# Patient Record
Sex: MALE | Race: WHITE | NOT HISPANIC OR LATINO | Employment: FULL TIME | URBAN - METROPOLITAN AREA
[De-identification: names, ages, dates, MRNs, and addresses within clinical notes are randomized per-mention and may not be internally consistent; named-entity substitution may affect disease eponyms.]

---

## 2019-03-30 ENCOUNTER — HOSPITAL ENCOUNTER (EMERGENCY)
Facility: MEDICAL CENTER | Age: 24
End: 2019-03-30
Attending: EMERGENCY MEDICINE

## 2019-03-30 VITALS
WEIGHT: 222.66 LBS | RESPIRATION RATE: 20 BRPM | HEIGHT: 71 IN | OXYGEN SATURATION: 94 % | DIASTOLIC BLOOD PRESSURE: 70 MMHG | HEART RATE: 91 BPM | SYSTOLIC BLOOD PRESSURE: 129 MMHG | BODY MASS INDEX: 31.17 KG/M2 | TEMPERATURE: 98.2 F

## 2019-03-30 DIAGNOSIS — T15.91XA FOREIGN BODY OF RIGHT EYE, INITIAL ENCOUNTER: ICD-10-CM

## 2019-03-30 PROCEDURE — 65205 REMOVE FOREIGN BODY FROM EYE: CPT

## 2019-03-30 PROCEDURE — 700101 HCHG RX REV CODE 250

## 2019-03-30 PROCEDURE — 99284 EMERGENCY DEPT VISIT MOD MDM: CPT

## 2019-03-30 RX ORDER — POLYMYXIN B SULFATE AND TRIMETHOPRIM 1; 10000 MG/ML; [USP'U]/ML
2 SOLUTION OPHTHALMIC EVERY 4 HOURS
Qty: 1 BOTTLE | Refills: 0 | Status: SHIPPED | OUTPATIENT
Start: 2019-03-30 | End: 2019-04-04

## 2019-03-30 RX ORDER — TETRACAINE HYDROCHLORIDE 5 MG/ML
SOLUTION OPHTHALMIC
Status: COMPLETED
Start: 2019-03-30 | End: 2019-03-30

## 2019-03-30 RX ORDER — PROPARACAINE HYDROCHLORIDE 5 MG/ML
SOLUTION/ DROPS OPHTHALMIC
Status: COMPLETED
Start: 2019-03-30 | End: 2019-03-30

## 2019-03-30 RX ORDER — POLYMYXIN B SULFATE AND TRIMETHOPRIM 1; 10000 MG/ML; [USP'U]/ML
2 SOLUTION OPHTHALMIC EVERY 4 HOURS
Qty: 1 BOTTLE | Refills: 0 | Status: SHIPPED | OUTPATIENT
Start: 2019-03-30 | End: 2019-03-30

## 2019-03-30 RX ADMIN — PROPARACAINE HYDROCHLORIDE 3 DROP: 5 SOLUTION/ DROPS OPHTHALMIC at 14:09

## 2019-03-30 RX ADMIN — FLUORESCEIN SODIUM 1 MG: 1 STRIP OPHTHALMIC at 14:08

## 2019-03-30 ASSESSMENT — PAIN DESCRIPTION - DESCRIPTORS: DESCRIPTORS: ACHING

## 2019-03-30 NOTE — ED NOTES
Pt in RM 11 eye chart done  C/o being light sensitive to R eye  Lights turned off for comfort   MD to evaluate

## 2019-03-30 NOTE — ED PROVIDER NOTES
"ED Provider Note    CHIEF COMPLAINT  Chief Complaint   Patient presents with   • Eye Pain   • Red Eye       HPI  Khoa Henriquez is a 23 y.o. male who presents complaining of right eye pain and redness since Friday.  The patient is not sure whether he got something stuck in his eye or not.  He denies any cough or cold symptoms.  He denies any runny nose.  He has no decrease in vision.  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  He has no history of previous eye injuries.  He does not wear any contact lenses.    REVIEW OF SYSTEMS  See HPI for further details. All other systems are negative.     PAST MEDICAL HISTORY  Past Medical History:   Diagnosis Date   • ASTHMA        FAMILY HISTORY  History reviewed. No pertinent family history.    SOCIAL HISTORY  Social History     Social History   • Marital status: Single     Spouse name: N/A   • Number of children: N/A   • Years of education: N/A     Social History Main Topics   • Smoking status: Current Every Day Smoker     Packs/day: 0.50     Types: Cigarettes     Start date: 7/30/2015   • Smokeless tobacco: Never Used   • Alcohol use No   • Drug use: No   • Sexual activity: Not on file     Other Topics Concern   • Not on file     Social History Narrative   • No narrative on file       SURGICAL HISTORY  No past surgical history on file.    CURRENT MEDICATIONS  Home Medications    **Home medications have not yet been reviewed for this encounter**         ALLERGIES  Allergies   Allergen Reactions   • Nkda [No Known Drug Allergy]        PHYSICAL EXAM  VITAL SIGNS: /97   Pulse 89   Temp 36.3 °C (97.4 °F) (Temporal)   Resp 18   Ht 1.803 m (5' 11\")   Wt 101 kg (222 lb 10.6 oz)   SpO2 99%   BMI 31.06 kg/m²       Constitutional: Well developed, Well nourished, No acute distress, Non-toxic appearance.   HENT: Normocephalic, Atraumatic, Bilateral external ears normal, Oropharynx moist, No oral exudates, Nose normal.   Eyes: Right eye is tearing and the " conjunctive is injected no periorbital erythema or edema no proptosis no hyphema or cell flare  Neck: Normal range of motion, No tenderness, Supple, No stridor.   Lymphatic: No lymphadenopathy noted.   Cardiovascular: Normal heart rate, Normal rhythm, No murmurs, No rubs, No gallops.   Thorax & Lungs: Normal breath sounds, No respiratory distress, No wheezing, No chest tenderness.   Skin: Warm, Dry, No erythema, No rash.   Extremities: Intact distal pulses, No edema, No tenderness, No cyanosis, No clubbing.         COURSE & MEDICAL DECISION MAKING  Pertinent Labs & Imaging studies reviewed. (See chart for details)    I performed a slit lamp exam on the patient with fluorescein dye and proparacaine.  I did see a foreign body on his 9:00 section of his conjunctiva.  He has no hyphema no cell flare and no fluorescein streaming.    I was able to remove this foreign body with an 18-gauge needle.  The patient tolerated the procedure well.  I will discharge him home on antibiotic eyedrops and have him follow-up with ophthalmology for recheck.  Patient thinks this might of happened at home in his garage and does not want to fill out Workmen's Comp. paperwork.  He will be discharged home in improved condition and is to return for any worsening redness drainage or worsening symptoms.    Current Outpatient Prescriptions   Medication Sig Dispense Refill   • polymixin-trimethoprim (POLYTRIM) 26825-2.1 UNIT/ML-% Solution Place 2 Drops in right eye every 4 hours for 5 days. 1 Bottle 0   • albuterol (VENTOLIN OR PROVENTIL) 108 (90 BASE) MCG/ACT AERS Inhale 2 Puffs by mouth every 6 hours as needed for Shortness of Breath. 8.5 g 3   • albuterol (PROVENTIL) 90 MCG/ACT AERS Inhale 2 Puffs by mouth every four hours as needed for Shortness of Breath. 1 Inhaler 3     Rodrigue Lopez M.D.  [address]  [place] [id number]  [phone]    Call in 1 day  for recheck         FINAL IMPRESSION  1. Foreign body of right eye, initial  encounter            Electronically signed by: Loly Cruz, 3/30/2019 1:50 PM

## 2019-03-30 NOTE — ED NOTES
Eye treatment done by MD   Removal of FB done by MD   Cleared for d/c  dischg instructions given to pt w/ Rx polytrim  Pt aware she must fill and take use as prescribed   To f/u w. Ophthalmology as needed

## 2019-03-30 NOTE — ED TRIAGE NOTES
"Pt presents complaining of right eye pain, with possible corneal abrasion, with associated photosensitivity, and spontaneous lacrimation for the past 2 days.    Chief Complaint   Patient presents with   • Eye Pain   • Red Eye         /97   Pulse 89   Temp 36.3 °C (97.4 °F) (Temporal)   Resp 18   Ht 1.803 m (5' 11\")   Wt 101 kg (222 lb 10.6 oz)   SpO2 99%   BMI 31.06 kg/m²     "